# Patient Record
Sex: FEMALE | Race: WHITE | NOT HISPANIC OR LATINO | URBAN - METROPOLITAN AREA
[De-identification: names, ages, dates, MRNs, and addresses within clinical notes are randomized per-mention and may not be internally consistent; named-entity substitution may affect disease eponyms.]

---

## 2023-09-01 ENCOUNTER — OFFICE (OUTPATIENT)
Dept: URBAN - METROPOLITAN AREA CLINIC 9 | Facility: CLINIC | Age: 38
End: 2023-09-01

## 2023-09-01 VITALS
DIASTOLIC BLOOD PRESSURE: 81 MMHG | HEIGHT: 62 IN | WEIGHT: 130 LBS | RESPIRATION RATE: 14 BRPM | SYSTOLIC BLOOD PRESSURE: 122 MMHG | OXYGEN SATURATION: 100 % | HEART RATE: 69 BPM

## 2023-09-01 DIAGNOSIS — R12 HEARTBURN: ICD-10-CM

## 2023-09-01 DIAGNOSIS — R11.0 NAUSEA: ICD-10-CM

## 2023-09-01 DIAGNOSIS — K58.9 IRRITABLE BOWEL SYNDROME WITHOUT DIARRHEA: ICD-10-CM

## 2023-09-01 DIAGNOSIS — R14.0 ABDOMINAL DISTENSION (GASEOUS): ICD-10-CM

## 2023-09-01 PROCEDURE — 99203 OFFICE O/P NEW LOW 30 MIN: CPT | Performed by: NURSE PRACTITIONER

## 2023-09-01 RX ORDER — FAMOTIDINE 20 MG/1
40 TABLET, FILM COATED ORAL
Qty: 60 | Refills: 11 | Status: ACTIVE
Start: 2023-09-01

## 2023-09-01 NOTE — SERVICEHPINOTES
Ms. Marie   Is a pleasant 38-year-old  female with a PMH significant for ADHD, asthma, endometriosis, GERD, suspected gluten intolerance, suspected irritable bowels -constipation, seasonal allergies, , tubal ligation . patient from PCP office for suspected gluten intolerance.  She states she had labs at [facility] urgent care and was told that she is intolerant to gluten.  Will try to obtain labs.  Do have CBC, CMP, TSH, cholesterol labs that are all within normal limits.  Celiac labs not present.  Will request.  She has had abdominal x-ray unrevealing.  She states for years she has had issues with irritable bowels and constipation.  She can fluctuate between constipation and loose stool, but predominant constipation.  States she has a bowel movement every couple days.  No blood or mucus.  She has allergy testing planning for later today.  She also complains of postnasal drip and chronic cough.  Heartburn reflux a few times out of the week, but not currently on antacid.  Will start famotidine 20 milligrams p.o. b.i.d..  Patient agreeable.  She admits to abdominal bloating, lower abdm cramping, nausea 1st thing in the morning, alternating bowel habits.  No primary family history of colon cancer, stomach cancer, other GI issues.  No known celiac disease or inflammatory bowel disease.

## 2023-09-26 LAB — CALPROTECTIN, FECAL: 33 UG/G (ref 0–120)

## 2023-09-29 ENCOUNTER — OFFICE (OUTPATIENT)
Dept: URBAN - METROPOLITAN AREA CLINIC 9 | Facility: CLINIC | Age: 38
End: 2023-09-29

## 2023-09-29 VITALS
SYSTOLIC BLOOD PRESSURE: 141 MMHG | WEIGHT: 130 LBS | HEIGHT: 62 IN | DIASTOLIC BLOOD PRESSURE: 84 MMHG | HEART RATE: 91 BPM | OXYGEN SATURATION: 98 %

## 2023-09-29 DIAGNOSIS — R12 HEARTBURN: ICD-10-CM

## 2023-09-29 DIAGNOSIS — K58.9 IRRITABLE BOWEL SYNDROME WITHOUT DIARRHEA: ICD-10-CM

## 2023-09-29 DIAGNOSIS — R14.0 ABDOMINAL DISTENSION (GASEOUS): ICD-10-CM

## 2023-09-29 PROCEDURE — 99213 OFFICE O/P EST LOW 20 MIN: CPT | Performed by: NURSE PRACTITIONER

## 2023-09-29 RX ORDER — HYOSCYAMINE SULFATE 0.12 MG/1
0.25 TABLET SUBLINGUAL
Qty: 30 | Refills: -1 | Status: ACTIVE
Start: 2023-09-29

## 2023-09-29 NOTE — SERVICEHPINOTES
Ms. Marie   Is a pleasant 38-year-old  female with a PMH significant for ADHD, asthma, endometriosis, GERD, suspected gluten intolerance, suspected irritable bowels -constipation, seasonal allergies, , tubal ligation .  Patient presents for her 4 week follow-up for reflux and alternating bowel habits.  Last office visit inflammatory markers okay and fecal calprotectin within normal limits.  Recommended probiotic daily and famotidine twice daily for reflux.   Patient states that her pyrosis has completely resolved on famotidine 20 mg p.o. b.i.d., but still having abdominal cramping worse with p.o. intake in the lower abdomen and constipation.  She states she post experiences constipation with a bowel movement every several days, but occasionally has diarrhea 2.  Her fecal calprotectin was within the normal limits.  No bloody stools or mucoid stools.  We discussed peppermint oil capsules and MiraLax.  We also discussed possible potential need for colonoscopy in the future if no relief in symptoms.  Did obtain her celiac serology from PCP and this was unrevealing.br
br 
br
OV HPI 23:br Patient from PCP office for suspected gluten intolerance.  She states she had labs at [facility] urgent care and was told that she is intolerant to gluten.  Will try to obtain labs.  PCP labs with CBC, CMP, TSH, cholesterol labs that are all within normal limits.  Celiac labs not present.  Will request.  She has had abdominal x-ray unrevealing.  She states for years she has had issues with irritable bowels and constipation.  She can fluctuate between constipation and loose stool, but predominant constipation.  States she has a bowel movement every couple days.  No blood or mucus.  She has allergy testing planning for later today.  She also complains of postnasal drip and chronic cough.  Heartburn reflux a few times out of the week, but not currently on antacid.  Will start famotidine 20 milligrams p.o. b.i.d..  Patient agreeable.  She admits to abdominal bloating, lower abdm cramping, nausea 1st thing in the morning, alternating bowel habits.  No primary family history of colon cancer, stomach cancer, other GI issues.  No known celiac disease or inflammatory bowel disease.